# Patient Record
Sex: FEMALE | Race: WHITE | NOT HISPANIC OR LATINO | Employment: UNEMPLOYED | URBAN - METROPOLITAN AREA
[De-identification: names, ages, dates, MRNs, and addresses within clinical notes are randomized per-mention and may not be internally consistent; named-entity substitution may affect disease eponyms.]

---

## 2024-10-18 ENCOUNTER — OFFICE VISIT (OUTPATIENT)
Dept: OBSTETRICS AND GYNECOLOGY | Facility: CLINIC | Age: 34
End: 2024-10-18
Payer: COMMERCIAL

## 2024-10-18 VITALS
HEIGHT: 67 IN | WEIGHT: 188 LBS | BODY MASS INDEX: 29.51 KG/M2 | DIASTOLIC BLOOD PRESSURE: 80 MMHG | SYSTOLIC BLOOD PRESSURE: 120 MMHG

## 2024-10-18 DIAGNOSIS — Z80.3 FAMILY HISTORY OF BREAST CANCER: Primary | ICD-10-CM

## 2024-10-18 DIAGNOSIS — Z01.419 WOMEN'S ANNUAL ROUTINE GYNECOLOGICAL EXAMINATION: ICD-10-CM

## 2024-10-18 NOTE — PROGRESS NOTES
Gynecologic Annual Exam Note        Gynecologic Exam        Subjective     HPI  Yuki Bell is a 34 y.o.  female who presents for annual well woman exam as a established patient. There were no changes to her medical or surgical history since her last visit.. LMP 10/05/2024 Her periods occur every 28-30, lasting 4-5 days.  The flow is heavy with clots  for first three days. She reports dysmenorrhea is mild occurring first 1-2 days of flow. Marital Status: .  She is sexually active. She has not had new partners.. STD testing recommendations have been explained to the patient and she does not desire STD testing.    The patient would like to discuss the following complaints today: declines    Additional OB/GYN History   contraceptive methods: Vasectomy   Desires to: do not start contraception  Thromboembolic Disease: none  History of migraines: no  Age of menarche: 12    History of STD: no    Last Pap : 2023. Results: negative. HPV: negative.   Last Completed Pap Smear            PAP SMEAR (Every 3 Years) Next due on 2023  LIQUID-BASED PAP SMEAR WITH HPV GENOTYPING REGARDLESS OF INTERPRETATION ([facility]CO,[facility])    2021  SCANNED - PAP SMEAR    2018  Done - Negative, G/C negative                     History of abnormal Pap smear: no  Gardasil status: has not completed  Family history of uterine, colon, breast, or ovarian cancer: yes - ovarian and breast MA  Performs monthly Self-Breast Exam: yes  Exercises Regularly:no  Feelings of Anxiety or Depression: yes - self controlled  Tobacco Usage?: No     No current outpatient medications on file.     Patient denies the need for medication refills today.    OB History          3    Para   3    Term   2       1    AB        Living   2         SAB        IAB        Ectopic        Molar        Multiple   0    Live Births   2                Health Maintenance   Topic Date Due    BMI FOLLOWUP  Never done     "ANNUAL PHYSICAL  Never done    INFLUENZA VACCINE  Never done    COVID-19 Vaccine (1 - 2023-24 season) Never done    Annual Gynecologic Pelvic and Breast Exam  10/19/2025    PAP SMEAR  08/14/2026    TDAP/TD VACCINES (3 - Td or Tdap) 07/14/2028    HEPATITIS C SCREENING  Completed    Pneumococcal Vaccine 0-64  Aged Out       Past Medical History:   Diagnosis Date    Anxiety     History of IUFD     24wks    Rh incompatibility     UTI (urinary tract infection)     Varicella     Yeast infection         Past Surgical History:   Procedure Laterality Date    APPENDECTOMY  2010    D & C WITH SUCTION N/A 10/6/2018    Procedure: DILATATION AND CURETTAGE WITH SUCTION;  Surgeon: Giacomo Miranda MD;  Location: [facility];  Service: Obstetrics/Gynecology       The additional following portions of the patient's history were reviewed and updated as appropriate: allergies, current medications, past family history, past medical history, past social history, past surgical history, and problem list.    Review of Systems   Constitutional: Negative.    Respiratory: Negative.     Cardiovascular: Negative.    Gastrointestinal: Negative.    Genitourinary: Negative.          I have reviewed and agree with the HPI, ROS, and historical information as entered above. Maria T Mcbride, APRN          Objective   /80   Ht 170.2 cm (67.01\")   Wt 85.3 kg (188 lb)   BMI 29.44 kg/m²     Physical Exam  Constitutional:       Appearance: Normal appearance.   Neck:      Thyroid: No thyroid mass or thyromegaly.   Pulmonary:      Effort: Pulmonary effort is normal.   Chest:      Chest wall: No mass.   Breasts:     Right: Normal. No inverted nipple, mass, nipple discharge or skin change.      Left: Normal. No inverted nipple, mass, nipple discharge or skin change.   Abdominal:      General: There is no distension.      Palpations: Abdomen is soft. There is no mass.      Tenderness: There is no abdominal tenderness.      Hernia: No hernia is " present.   Genitourinary:     General: Normal vulva.      Labia:         Right: No rash.         Left: No rash.       Vagina: Normal.      Cervix: No cervical motion tenderness or lesion.      Uterus: Normal.       Adnexa: Right adnexa normal and left adnexa normal.        Right: No mass or tenderness.          Left: No mass or tenderness.     Neurological:      Mental Status: She is alert.            Assessment and Plan    Problem List Items Addressed This Visit          Family History    Family history of breast cancer - Primary    Overview     Maternal aunt x 2 and a third undergoing evaluation currently. She reports having genetic screening tests done at , we do not have those records. 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           Other Visit Diagnoses       Women's annual routine gynecological examination              Previous urethral cyst resolved on its own last year so she did not follow up with urology. No issues currently.     GYN annual well woman exam.   Reviewed pap guidelines.   Reviewed monthly self breast exams.  Instructed to call with lumps, pain, or breast discharge.    Return in about 1 year (around 10/18/2025) for Annual physical.    Maria T Mcbride, APRN  10/18/2024

## 2024-10-21 DIAGNOSIS — Z91.89 AT HIGH RISK FOR BREAST CANCER: Primary | ICD-10-CM

## 2024-11-07 ENCOUNTER — TELEPHONE (OUTPATIENT)
Dept: OBSTETRICS AND GYNECOLOGY | Facility: CLINIC | Age: 34
End: 2024-11-07
Payer: COMMERCIAL

## 2024-11-07 NOTE — TELEPHONE ENCOUNTER
"Called to relay this message to patient as requested by CRISTIANE Montemayor. Pt VU of the following message. Advised patient that if they call to schedule her MRI just disregard it.      Can you call and relay this message to the patient. Breast imaging sent this to me..I reviewed this patient's records with Dr. Santos. She will qualify for a screening breast MRI when she starts her yearly mammograms. 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    There are different variants and the one she has does not qualify her to start the MRI until she starts yearly screening mammograms at age 41   "

## 2024-11-11 ENCOUNTER — TELEPHONE (OUTPATIENT)
Dept: OBSTETRICS AND GYNECOLOGY | Facility: CLINIC | Age: 34
End: 2024-11-11
Payer: COMMERCIAL

## 2024-11-11 NOTE — TELEPHONE ENCOUNTER
PT WAS REFERRED FOR AN MRI - ORDER REFERRAL WAS SENT TO RADIOLOGY     RADIOLOGY ROUTED HER REFERRAL  BACK WITH THE FOLLOWING COMMUNICATION: Emailed provider that I reviewed case with Dr. 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

## 2024-12-18 ENCOUNTER — TELEPHONE (OUTPATIENT)
Dept: OBSTETRICS AND GYNECOLOGY | Facility: CLINIC | Age: 34
End: 2024-12-18

## 2024-12-18 NOTE — TELEPHONE ENCOUNTER
Caller: Yuki Bell    Relationship to patient: Self    Best call back number: [phone] (home)       Patient is needing: PT THINKS SHE HAS BV. SHE STATES SHE HAS A VAGINAL ODOR THAT IS MAINLY AFTER INTERCOURSE. SHE FIRST NOTICED IN ABOUT A WEEK AGO. THE HUBS FIRST AVAILABLE AT [facility] IS 01/02 BUT PT STATED SHE WOULD LIKE TO BE SEEN BEFORE THEN. SHE DECLINED CHADD RD LOCATION. OKAY WITH SEEING ANY APRN OR DR BURKS.